# Patient Record
Sex: FEMALE | Race: WHITE
[De-identification: names, ages, dates, MRNs, and addresses within clinical notes are randomized per-mention and may not be internally consistent; named-entity substitution may affect disease eponyms.]

---

## 2018-03-24 ENCOUNTER — HOSPITAL ENCOUNTER (EMERGENCY)
Dept: HOSPITAL 88 - FSED | Age: 48
Discharge: HOME | End: 2018-03-24
Payer: COMMERCIAL

## 2018-03-24 VITALS — HEIGHT: 62 IN | WEIGHT: 196.06 LBS | BODY MASS INDEX: 36.08 KG/M2

## 2018-03-24 DIAGNOSIS — R10.30: Primary | ICD-10-CM

## 2018-03-24 DIAGNOSIS — N30.91: ICD-10-CM

## 2018-03-24 DIAGNOSIS — I10: ICD-10-CM

## 2018-03-24 PROCEDURE — 99283 EMERGENCY DEPT VISIT LOW MDM: CPT

## 2018-03-24 PROCEDURE — 87086 URINE CULTURE/COLONY COUNT: CPT

## 2018-03-25 ENCOUNTER — HOSPITAL ENCOUNTER (EMERGENCY)
Dept: HOSPITAL 88 - FSED | Age: 48
Discharge: HOME | End: 2018-03-25
Payer: COMMERCIAL

## 2018-03-25 VITALS — HEIGHT: 62 IN | WEIGHT: 196 LBS | BODY MASS INDEX: 36.07 KG/M2

## 2018-03-25 DIAGNOSIS — T88.7XXA: ICD-10-CM

## 2018-03-25 DIAGNOSIS — I10: ICD-10-CM

## 2018-03-25 DIAGNOSIS — N30.91: ICD-10-CM

## 2018-03-25 DIAGNOSIS — R10.9: ICD-10-CM

## 2018-03-25 DIAGNOSIS — R10.2: Primary | ICD-10-CM

## 2018-03-25 PROCEDURE — 99282 EMERGENCY DEPT VISIT SF MDM: CPT

## 2020-09-20 NOTE — EMERGENCY DEPARTMENT NOTE
History of Present Illnes


History of Present Illness


Chief Complaint:  Chest Pain


History of Present Illness


This is a 50 year old  female who presents with CP. States had SOB at a

wakening this AM followed shortly by CP described as "pressure". Symptoms have 

been intermittent today. This afternoon had lightheadness when standing that 

resolved when sitting down. Similar CP/SOB symptoms since July when dx with 

COVID. However, prior sx have always been in evening when laying in bed. No 

N/V/diaphoresis. + HTN. Yearly cholesterol always WNL. No family Hx of heart dz.

No smoking or drugs. No DM. No LE pain or swelling. No recent travel. Currently 

has no symptoms.


Historian:  Patient


Arrival Mode:  Car


 Required:  No


Onset (how long ago):  hour(s) (current episode)


Radiation:  Reports non-radiation


Severity:  unable to specify


Onset quality:  gradual


Duration (how long):  hour(s) (current episode)


Timing of current episode:  intermittent


Progression:  waxing and waning


Chronicity:  recurrent


Context:  Reports recent illness (COVID: symptoms, lack of taste/smell and fever

for 2 days); 


   Denies trauma/injury


Relieving factors:  none


Exacerbating factors:  none


Associated symptoms:  Reports chest pain, Reports shortness of breath; 


   Denies confusion, Denies cough, Denies diaphoresis, Denies fever/chills, 

Denies headaches, Denies loss of appetite, Denies malaise, Denies nausea/v

omiting, Denies rash, Denies syncope, Denies weakness





Past Medical/Family History


Physician Review


I have reviewed the patient's past medical and family history.  Any updates have

been documented here.





Past Medical History


Recent Fever:  No


Clinical Suspicion of Infectio:  No


New/Unexplained Change in Ment:  No


Past Medical History:  Hypertension


Past Surgical History:  Cholecysctectomy, 


Other Surgery:  


LEFT ANKLE





COVID+ (20)





Social History


Smoking Cessation:  Never Smoker


Counseling Performed:  No


Alcohol Use:  None


Any Illegal Drug Use:  No


Physically hurt or threatened:  No





Other


Last Tetanus:  unk


Any Pre-Existing Lines (PICC,:  No





Review of Systems


Review of Systems


Constitutional:  Denies chills, Denies fever


EENTM:  Denies nose congestion


Cardiovascular:  Reports chest pain; 


   Denies edema, Denies palpitations, Denies syncope


Respiratory:  Reports dyspnea; 


   Denies cough, Denies pain with cough, Denies dyspnea on exertion


Gastrointestinal:  Denies abdominal pain, Denies diarrhea, Denies nausea, Denies

vomiting


Genitourinary:  Denies dysuria, Denies hematuria


Musculoskeletal:  Denies back pain, Denies muscle pain, Denies muscle stiffness


Integumentary:  Denies rash


Neurological:  Denies headache, Denies numbness, Denies paresthesia


Psychological:  Reports anxiety


Endocrine:  Denies increased thirst, Denies increased urination


Hematological/Lymphatic:  Denies blood clots, Denies easy bleeding, Denies easy 

bruising





Physical Exam


Related Data


Allergies:  


Coded Allergies:  


     acetaminophen (Verified  Allergy, Unknown, 3/24/18)


     hydrocodone (Verified  Allergy, Unknown, 3/24/18)


     meperidine (Verified  Allergy, Unknown, 3/24/18)


Uncoded Allergies:  


     DARVOCET (Allergy, Mild, itching, 3/24/18)


Triage Vital Signs





Vital Signs








  Date Time  Temp Pulse Resp B/P (MAP) Pulse Ox O2 Delivery O2 Flow Rate FiO2


 


20 19:35 98.9 84 18 146/86 100 Room Air  











Physical Exam


CONSTITUTIONAL





Constitutional:  Present well-developed, Present well-nourished


HENT


HENT:  Present normocephalic, Present atraumatic, Present oropharynx 

clear/moist, Present nose normal


HENT L/R:  Present left ext ear normal, Present right ext ear normal


EYES





Eyes:  Reports PERRL, Reports conjunctivae normal


NECK


Neck:  Present ROM normal


PULMONARY


Pulmonary:  Present effort normal, Present breath sounds normal


CARDIOVASCULAR





Cardiovascular:  Present regular rhythm, Present heart sounds normal, Present 

capillary refill normal, Present normal rate, Present other (Pain reproduced 

with active ROM of pectoralis muscles against resistnace)


GASTROINTESTINAL





Abdominal:  Present soft, Present nontender, Present bowel sounds normal


GENITOURINARY





SKIN


Skin:  Absent rash


MUSCULOSKELETAL





Musculoskeletal:  Present ROM normal


NEUROLOGICAL





Neurological:  Present alert, Present oriented x 3, Present no gross motor or 

sensory deficits


PSYCHOLOGICAL


Psychological:  Present mood/affect normal, Present judgement normal





Results


Laboratory


Laboratory comments


CMP WNL, Troponin negative, WBC 6.2, HGB 11.6, HCT 37.0, . 2hr troponin 

negative.





Imaging


Imaging Comments


FINDINGS:


TUBES and LINES:  None.





LUNGS:  Normal lung volumes.  Lungs are clear.  No consolidations.





PLEURA:  No pleural effusion or pneumothorax.





HEART AND MEDIASTINUM:  The cardiomediastinal silhouette is unremarkable.    





BONES AND SOFT TISSUES:  No acute osseous lesion.  Soft tissues are


unremarkable.





UPPER ABDOMEN: No free air under the diaphragm. Cholecystectomy clips.





IMPRESSION: 





No acute thoracic radiographic abnormality.





Signed by: Zachery Lal MD on 2020 9:00 PM








Dictated By: ZACHERY LAL MD





Procedures


12 Lead ECG Interpretation


ECG Interpretation :  


   ECG:  ECG 1


   :  Interpreted by ED physician


   Date:  Sep 20, 2020


   Rhythm:  sinus rhythm


   Rate:  normal


   BPM:  77


   QRS axis:  normal


   ST segments normal:  Yes


   T waves normal:  Yes


   Clinical Impression:  abnormal ECG


   Additional Comments


UT interval 102 ms





Clinical Decision Tools


HEART Score


HEART Score:  








HEART Score Response (Comments) Value


 


History Moderately suspicious 1


 


EKG Normal 0


 


Age                                     45 - 65 1


 


Risk factors                            1 or 2 risk factors 1


 


Troponin < or = to normal limit 


 


Total  3











Assessment & Plan


Medical Decision Making


MDM


Symptoms include but are not limited to: MI, ACS, PE, Chest wall pain, 

Gastritis, GERD, dissection, AAA, pneumonia, shingles. Troponin negative. 

Patient declined admission and wished to leave against my advice. Aware of risks

including death and permanent disability. Patient agreed to repeat troponin 

although only at 2hrs and agreed to prompt follow-up with cardiology. Gave 

strict return precautions.





Reassessment


Reassessment time:  22:08


Reassessment


No CP.





Assessment & Plan


Final Impression:  


(1) Chest pain


(2) Hypertension


Depart Disposition:  HOME, SELF-CARE


Last Vital Signs











  Date Time  Temp Pulse Resp B/P (MAP) Pulse Ox O2 Delivery O2 Flow Rate FiO2


 


20 19:35 98.9 84 18 146/86 100 Room Air  








Home Meds


Reported Medications


Lisinopril (LISINOPRIL) 2.5 Mg Tablet, 2.5 MG PO DAILY, #30 TAB


   3/24/18


Ciprofloxacin Hcl (CIPRO) 500 Mg Tablet, 500 MG PO Q12H, #30 TAB


   14


Lisinopril (ZESTRIL) 40 Mg Tablet, 40 MG PO DAILY


   14


Medications in the ED





Famotidine 20 mg NOW  STAT IV  Last administered on 20at 20:46; Admin Dose 

20 MG;  Start 20 at 20:25;  Stop 20 at 20:42;  Status DC


Famotidine 20 mg STK-MED ONCE IV ;  Start 20 at 20:53;  Stop 20 at 

20:46;  Status DC











DEBBIE STRONG MD              Sep 20, 2020 22:12

## 2020-09-20 NOTE — DIAGNOSTIC IMAGING REPORT
EXAMINATION:  CXR 2 VIEW - [facility]    



INDICATION:      

^Chest Pain 



COMPARISON:  None

     

FINDINGS:

TUBES and LINES:  None.



LUNGS:  Normal lung volumes.  Lungs are clear.  No consolidations.



PLEURA:  No pleural effusion or pneumothorax.



HEART AND MEDIASTINUM:  The cardiomediastinal silhouette is unremarkable.    



BONES AND SOFT TISSUES:  No acute osseous lesion.  Soft tissues are

unremarkable.



UPPER ABDOMEN: No free air under the diaphragm. Cholecystectomy clips.



IMPRESSION: 



No acute thoracic radiographic abnormality.



Signed by: Seth Anders MD on 9/20/2020 9:00 PM